# Patient Record
Sex: MALE | Race: WHITE | NOT HISPANIC OR LATINO | URBAN - METROPOLITAN AREA
[De-identification: names, ages, dates, MRNs, and addresses within clinical notes are randomized per-mention and may not be internally consistent; named-entity substitution may affect disease eponyms.]

---

## 2021-12-20 ENCOUNTER — APPOINTMENT (OUTPATIENT)
Age: 61
Setting detail: DERMATOLOGY
End: 2021-12-20

## 2021-12-20 VITALS — WEIGHT: 195 LBS | HEIGHT: 72 IN

## 2021-12-20 DIAGNOSIS — L82.1 OTHER SEBORRHEIC KERATOSIS: ICD-10-CM

## 2021-12-20 DIAGNOSIS — D22 MELANOCYTIC NEVI: ICD-10-CM

## 2021-12-20 PROBLEM — D48.5 NEOPLASM OF UNCERTAIN BEHAVIOR OF SKIN: Status: ACTIVE | Noted: 2021-12-20

## 2021-12-20 PROBLEM — D22.61 MELANOCYTIC NEVI OF RIGHT UPPER LIMB, INCLUDING SHOULDER: Status: ACTIVE | Noted: 2021-12-20

## 2021-12-20 PROCEDURE — OTHER BIOPSY BY SHAVE METHOD: OTHER

## 2021-12-20 PROCEDURE — OTHER REASSURANCE: OTHER

## 2021-12-20 PROCEDURE — 99203 OFFICE O/P NEW LOW 30 MIN: CPT | Mod: 25

## 2021-12-20 PROCEDURE — OTHER COUNSELING: OTHER

## 2021-12-20 PROCEDURE — 11102 TANGNTL BX SKIN SINGLE LES: CPT

## 2021-12-20 PROCEDURE — OTHER MIPS QUALITY: OTHER

## 2021-12-20 ASSESSMENT — LOCATION ZONE DERM
LOCATION ZONE: TRUNK
LOCATION ZONE: ARM
LOCATION ZONE: NECK
LOCATION ZONE: FACE

## 2021-12-20 ASSESSMENT — LOCATION DETAILED DESCRIPTION DERM
LOCATION DETAILED: RIGHT PROXIMAL LATERAL POSTERIOR UPPER ARM
LOCATION DETAILED: RIGHT MID-UPPER BACK
LOCATION DETAILED: RIGHT SUPERIOR LATERAL MIDBACK
LOCATION DETAILED: LEFT LATERAL FOREHEAD
LOCATION DETAILED: LEFT MID-UPPER BACK
LOCATION DETAILED: LEFT INFERIOR LATERAL MIDBACK
LOCATION DETAILED: LEFT CLAVICULAR NECK

## 2021-12-20 ASSESSMENT — LOCATION SIMPLE DESCRIPTION DERM
LOCATION SIMPLE: LEFT UPPER BACK
LOCATION SIMPLE: RIGHT UPPER ARM
LOCATION SIMPLE: LEFT ANTERIOR NECK
LOCATION SIMPLE: RIGHT UPPER BACK
LOCATION SIMPLE: RIGHT LOWER BACK
LOCATION SIMPLE: LEFT LOWER BACK
LOCATION SIMPLE: LEFT FOREHEAD

## 2021-12-20 NOTE — PROCEDURE: BIOPSY BY SHAVE METHOD
Wound Care: Petrolatum
Hide Second Anesthesia?: No
Was A Bandage Applied: Yes
Anesthesia Volume In Cc (Will Not Render If 0): 0.5
Cryotherapy Text: The wound bed was treated with cryotherapy after the biopsy was performed.
Hemostasis: Drysol
Electrodesiccation Text: The wound bed was treated with electrodesiccation after the biopsy was performed.
Detail Level: Detailed
Size Of Lesion In Cm: 0
Electrodesiccation And Curettage Text: The wound bed was treated with electrodesiccation and curettage after the biopsy was performed.
Anesthesia Type: 1% lidocaine with epinephrine
Information: Selecting Yes will display possible errors in your note based on the variables you have selected. This validation is only offered as a suggestion for you. PLEASE NOTE THAT THE VALIDATION TEXT WILL BE REMOVED WHEN YOU FINALIZE YOUR NOTE. IF YOU WANT TO FAX A PRELIMINARY NOTE YOU WILL NEED TO TOGGLE THIS TO 'NO' IF YOU DO NOT WANT IT IN YOUR FAXED NOTE.
Consent: Written consent was obtained and risks were reviewed including but not limited to scarring, infection, bleeding, scabbing, incomplete removal, nerve damage and allergy to anesthesia.
Biopsy Method: Dermablade
Post-Care Instructions: I reviewed with the patient in detail post-care instructions. Patient is to keep the biopsy site dry overnight, and then apply bacitracin twice daily until healed. Patient may apply hydrogen peroxide soaks to remove any crusting.
Curettage Text: The wound bed was treated with curettage after the biopsy was performed.
Dressing: bandage
Depth Of Biopsy: dermis
Billing Type: Third-Party Bill
Type Of Destruction Used: Curettage
Notification Instructions: Patient will be notified of biopsy results. However, patient instructed to call the office if not contacted within 2 weeks.
Silver Nitrate Text: The wound bed was treated with silver nitrate after the biopsy was performed.
Biopsy Type: H and E

## 2022-03-08 ENCOUNTER — APPOINTMENT (OUTPATIENT)
Age: 62
Setting detail: DERMATOLOGY
End: 2022-03-08

## 2022-03-08 DIAGNOSIS — D22 MELANOCYTIC NEVI: ICD-10-CM

## 2022-03-08 PROBLEM — D22.4 MELANOCYTIC NEVI OF SCALP AND NECK: Status: ACTIVE | Noted: 2022-03-08

## 2022-03-08 PROCEDURE — OTHER COUNSELING: OTHER

## 2022-03-08 PROCEDURE — OTHER OBSERVATION: OTHER

## 2022-03-08 PROCEDURE — 99212 OFFICE O/P EST SF 10 MIN: CPT

## 2022-03-08 ASSESSMENT — LOCATION SIMPLE DESCRIPTION DERM: LOCATION SIMPLE: LEFT ANTERIOR NECK

## 2022-03-08 ASSESSMENT — LOCATION DETAILED DESCRIPTION DERM: LOCATION DETAILED: LEFT CLAVICULAR NECK

## 2022-03-08 ASSESSMENT — LOCATION ZONE DERM: LOCATION ZONE: NECK

## 2022-09-08 ENCOUNTER — APPOINTMENT (OUTPATIENT)
Age: 62
Setting detail: DERMATOLOGY
End: 2022-09-08

## 2022-09-08 DIAGNOSIS — L57.0 ACTINIC KERATOSIS: ICD-10-CM

## 2022-09-08 DIAGNOSIS — D22 MELANOCYTIC NEVI: ICD-10-CM

## 2022-09-08 DIAGNOSIS — L82.1 OTHER SEBORRHEIC KERATOSIS: ICD-10-CM

## 2022-09-08 PROBLEM — D48.5 NEOPLASM OF UNCERTAIN BEHAVIOR OF SKIN: Status: ACTIVE | Noted: 2022-09-08

## 2022-09-08 PROBLEM — D22.4 MELANOCYTIC NEVI OF SCALP AND NECK: Status: ACTIVE | Noted: 2022-09-08

## 2022-09-08 PROCEDURE — 99213 OFFICE O/P EST LOW 20 MIN: CPT | Mod: 25

## 2022-09-08 PROCEDURE — 11103 TANGNTL BX SKIN EA SEP/ADDL: CPT

## 2022-09-08 PROCEDURE — 11102 TANGNTL BX SKIN SINGLE LES: CPT

## 2022-09-08 PROCEDURE — OTHER BIOPSY BY SHAVE METHOD: OTHER

## 2022-09-08 PROCEDURE — OTHER MIPS QUALITY: OTHER

## 2022-09-08 PROCEDURE — OTHER COUNSELING: OTHER

## 2022-09-08 ASSESSMENT — LOCATION ZONE DERM
LOCATION ZONE: TRUNK
LOCATION ZONE: ARM
LOCATION ZONE: SCALP
LOCATION ZONE: SCALP

## 2022-09-08 ASSESSMENT — LOCATION DETAILED DESCRIPTION DERM
LOCATION DETAILED: RIGHT SUPERIOR PARIETAL SCALP
LOCATION DETAILED: RIGHT ANTERIOR PROXIMAL UPPER ARM
LOCATION DETAILED: LEFT SUPERIOR UPPER BACK
LOCATION DETAILED: LEFT CENTRAL FRONTAL SCALP

## 2022-09-08 ASSESSMENT — LOCATION SIMPLE DESCRIPTION DERM
LOCATION SIMPLE: LEFT UPPER BACK
LOCATION SIMPLE: RIGHT UPPER ARM
LOCATION SIMPLE: LEFT SCALP
LOCATION SIMPLE: SCALP

## 2022-09-08 NOTE — PROCEDURE: BIOPSY BY SHAVE METHOD
Biopsy Method: Dermablade
Validate Lesion Size: No
Additional Anesthesia Volume In Cc (Will Not Render If 0): 0
Notification Instructions: Patient will be notified of biopsy results. However, patient instructed to call the office if not contacted within 2 weeks.
Anesthesia Volume In Cc (Will Not Render If 0): 0.5
Wound Care: Petrolatum
Anesthesia Type: 1% lidocaine with epinephrine
Electrodesiccation And Curettage Text: The wound bed was treated with electrodesiccation and curettage after the biopsy was performed.
Information: Selecting Yes will display possible errors in your note based on the variables you have selected. This validation is only offered as a suggestion for you. PLEASE NOTE THAT THE VALIDATION TEXT WILL BE REMOVED WHEN YOU FINALIZE YOUR NOTE. IF YOU WANT TO FAX A PRELIMINARY NOTE YOU WILL NEED TO TOGGLE THIS TO 'NO' IF YOU DO NOT WANT IT IN YOUR FAXED NOTE.
Billing Type: Third-Party Bill
Cryotherapy Text: The wound bed was treated with cryotherapy after the biopsy was performed.
Post-Care Instructions: I reviewed with the patient in detail post-care instructions. Patient is to keep the biopsy site dry overnight, and then apply bacitracin twice daily until healed. Patient may apply hydrogen peroxide soaks to remove any crusting.
Was A Bandage Applied: Yes
Detail Level: Detailed
Dressing: bandage
Hemostasis: Drysol
Biopsy Type: H and E
Silver Nitrate Text: The wound bed was treated with silver nitrate after the biopsy was performed.
Type Of Destruction Used: Curettage
Depth Of Biopsy: dermis
Electrodesiccation Text: The wound bed was treated with electrodesiccation after the biopsy was performed.
Consent: Written consent was obtained and risks were reviewed including but not limited to scarring, infection, bleeding, scabbing, incomplete removal, nerve damage and allergy to anesthesia.
Curettage Text: The wound bed was treated with curettage after the biopsy was performed.

## 2022-09-08 NOTE — PROCEDURE: MIPS QUALITY
Quality 111:Pneumonia Vaccination Status For Older Adults: Pneumococcal vaccine administered on or after patient’s 60th birthday and before the end of the measurement period
Detail Level: Detailed
Quality 431: Preventive Care And Screening: Unhealthy Alcohol Use - Screening: Patient not identified as an unhealthy alcohol user when screened for unhealthy alcohol use using a systematic screening method
Quality 226: Preventive Care And Screening: Tobacco Use: Screening And Cessation Intervention: Patient screened for tobacco use and is an ex/non-smoker
Quality 110: Preventive Care And Screening: Influenza Immunization: Influenza immunization was not ordered or administered, reason not given

## 2022-09-26 ENCOUNTER — APPOINTMENT (OUTPATIENT)
Age: 62
Setting detail: DERMATOLOGY
End: 2022-09-26

## 2022-09-26 DIAGNOSIS — L57.0 ACTINIC KERATOSIS: ICD-10-CM

## 2022-09-26 PROCEDURE — OTHER REASSURANCE: OTHER

## 2022-09-26 PROCEDURE — 17003 DESTRUCT PREMALG LES 2-14: CPT | Mod: 79

## 2022-09-26 PROCEDURE — OTHER LIQUID NITROGEN: OTHER

## 2022-09-26 PROCEDURE — OTHER PATHOLOGY DISCUSSION: OTHER

## 2022-09-26 PROCEDURE — OTHER COUNSELING: OTHER

## 2022-09-26 PROCEDURE — 99024 POSTOP FOLLOW-UP VISIT: CPT

## 2022-09-26 PROCEDURE — 17000 DESTRUCT PREMALG LESION: CPT | Mod: 79

## 2022-09-26 ASSESSMENT — LOCATION DETAILED DESCRIPTION DERM: LOCATION DETAILED: RIGHT SUPERIOR PARIETAL SCALP

## 2022-09-26 ASSESSMENT — LOCATION ZONE DERM: LOCATION ZONE: SCALP

## 2022-09-26 ASSESSMENT — LOCATION SIMPLE DESCRIPTION DERM: LOCATION SIMPLE: SCALP

## 2022-09-26 NOTE — PROCEDURE: LIQUID NITROGEN
Render Post-Care Instructions In Note?: no
Show Applicator Variable?: Yes
Number Of Freeze-Thaw Cycles: 1 freeze-thaw cycle
Consent: The patient's consent was obtained including but not limited to risks of crusting, scabbing, blistering, scarring, darker or lighter pigmentary change, recurrence, incomplete removal and infection.
Post-Care Instructions: I reviewed with the patient in detail post-care instructions. Patient is to wear sunprotection, and avoid picking at any of the treated lesions. Pt may apply Vaseline to crusted or scabbing areas.
Detail Level: Detailed
Duration Of Freeze Thaw-Cycle (Seconds): 0

## 2022-11-07 ENCOUNTER — APPOINTMENT (OUTPATIENT)
Age: 62
Setting detail: DERMATOLOGY
End: 2022-11-07

## 2022-11-07 DIAGNOSIS — L82.0 INFLAMED SEBORRHEIC KERATOSIS: ICD-10-CM

## 2022-11-07 DIAGNOSIS — D22 MELANOCYTIC NEVI: ICD-10-CM

## 2022-11-07 DIAGNOSIS — L82.1 OTHER SEBORRHEIC KERATOSIS: ICD-10-CM

## 2022-11-07 PROBLEM — D48.5 NEOPLASM OF UNCERTAIN BEHAVIOR OF SKIN: Status: ACTIVE | Noted: 2022-11-07

## 2022-11-07 PROBLEM — D22.62 MELANOCYTIC NEVI OF LEFT UPPER LIMB, INCLUDING SHOULDER: Status: ACTIVE | Noted: 2022-11-07

## 2022-11-07 PROCEDURE — 99213 OFFICE O/P EST LOW 20 MIN: CPT | Mod: 25

## 2022-11-07 PROCEDURE — OTHER COUNSELING: OTHER

## 2022-11-07 PROCEDURE — OTHER BIOPSY BY SHAVE METHOD: OTHER

## 2022-11-07 PROCEDURE — 11102 TANGNTL BX SKIN SINGLE LES: CPT

## 2022-11-07 ASSESSMENT — LOCATION SIMPLE DESCRIPTION DERM
LOCATION SIMPLE: LEFT UPPER BACK
LOCATION SIMPLE: LEFT FOREARM
LOCATION SIMPLE: ABDOMEN

## 2022-11-07 ASSESSMENT — LOCATION DETAILED DESCRIPTION DERM
LOCATION DETAILED: LEFT DISTAL DORSAL FOREARM
LOCATION DETAILED: LEFT SUPERIOR LATERAL UPPER BACK
LOCATION DETAILED: EPIGASTRIC SKIN

## 2022-11-07 ASSESSMENT — LOCATION ZONE DERM
LOCATION ZONE: TRUNK
LOCATION ZONE: TRUNK
LOCATION ZONE: ARM

## 2022-11-07 NOTE — PROCEDURE: BIOPSY BY SHAVE METHOD
Validate That Anesthesia Is Not 0: No
Cryotherapy Text: The wound bed was treated with cryotherapy after the biopsy was performed.
Notification Instructions: Patient will be notified of biopsy results. However, patient instructed to call the office if not contacted within 2 weeks.
Biopsy Method: Dermablade
Anesthesia Type: 1% lidocaine with epinephrine
Additional Anesthesia Volume In Cc (Will Not Render If 0): 0
Was A Bandage Applied: Yes
Type Of Destruction Used: Curettage
Billing Type: Third-Party Bill
Silver Nitrate Text: The wound bed was treated with silver nitrate after the biopsy was performed.
Wound Care: Petrolatum
Post-Care Instructions: I reviewed with the patient in detail post-care instructions. Patient is to keep the biopsy site dry overnight, and then apply bacitracin twice daily until healed. Patient may apply hydrogen peroxide soaks to remove any crusting.
Biopsy Type: H and E
Information: Selecting Yes will display possible errors in your note based on the variables you have selected. This validation is only offered as a suggestion for you. PLEASE NOTE THAT THE VALIDATION TEXT WILL BE REMOVED WHEN YOU FINALIZE YOUR NOTE. IF YOU WANT TO FAX A PRELIMINARY NOTE YOU WILL NEED TO TOGGLE THIS TO 'NO' IF YOU DO NOT WANT IT IN YOUR FAXED NOTE.
Electrodesiccation Text: The wound bed was treated with electrodesiccation after the biopsy was performed.
Curettage Text: The wound bed was treated with curettage after the biopsy was performed.
Depth Of Biopsy: dermis
Hemostasis: Drysol
Anesthesia Volume In Cc (Will Not Render If 0): 0.5
Electrodesiccation And Curettage Text: The wound bed was treated with electrodesiccation and curettage after the biopsy was performed.
Consent: Written consent was obtained and risks were reviewed including but not limited to scarring, infection, bleeding, scabbing, incomplete removal, nerve damage and allergy to anesthesia.
Detail Level: Detailed
Dressing: bandage

## 2022-11-28 ENCOUNTER — APPOINTMENT (OUTPATIENT)
Age: 62
Setting detail: DERMATOLOGY
End: 2022-11-28

## 2022-11-28 DIAGNOSIS — L82.0 INFLAMED SEBORRHEIC KERATOSIS: ICD-10-CM

## 2022-11-28 DIAGNOSIS — Z48.02 ENCOUNTER FOR REMOVAL OF SUTURES: ICD-10-CM

## 2022-11-28 PROCEDURE — OTHER PATHOLOGY DISCUSSION: OTHER

## 2022-11-28 PROCEDURE — OTHER COUNSELING: OTHER

## 2022-11-28 PROCEDURE — OTHER MIPS QUALITY: OTHER

## 2022-11-28 PROCEDURE — 99024 POSTOP FOLLOW-UP VISIT: CPT

## 2022-11-28 ASSESSMENT — LOCATION DETAILED DESCRIPTION DERM: LOCATION DETAILED: LEFT LATERAL ABDOMEN

## 2022-11-28 ASSESSMENT — LOCATION ZONE DERM: LOCATION ZONE: TRUNK

## 2022-11-28 ASSESSMENT — LOCATION SIMPLE DESCRIPTION DERM: LOCATION SIMPLE: ABDOMEN

## 2022-11-28 NOTE — PROCEDURE: PATHOLOGY DISCUSSION
Detail Level: Zone
Add 77376 Cpt? (Important Note: In 2017 The Use Of 77953 Is Being Tracked By Cms To Determine Future Global Period Reimbursement For Global Periods): yes

## 2022-11-28 NOTE — PROCEDURE: MIPS QUALITY
Detail Level: Detailed
Quality 226: Preventive Care And Screening: Tobacco Use: Screening And Cessation Intervention: Patient screened for tobacco use and is an ex/non-smoker
Quality 111:Pneumonia Vaccination Status For Older Adults: Pneumococcal vaccine (PPSV23) administered on or after patient’s 60th birthday and before the end of the measurement period
Quality 110: Preventive Care And Screening: Influenza Immunization: Influenza Immunization previously received during influenza season
Quality 431: Preventive Care And Screening: Unhealthy Alcohol Use - Screening: Patient not identified as an unhealthy alcohol user when screened for unhealthy alcohol use using a systematic screening method

## 2023-05-18 ENCOUNTER — APPOINTMENT (OUTPATIENT)
Dept: URBAN - METROPOLITAN AREA SURGERY 21 | Age: 63
Setting detail: DERMATOLOGY
End: 2023-05-18

## 2023-05-18 DIAGNOSIS — D49.2 NEOPLASM OF UNSPECIFIED BEHAVIOR OF BONE, SOFT TISSUE, AND SKIN: ICD-10-CM

## 2023-05-18 DIAGNOSIS — L94.2 CALCINOSIS CUTIS: ICD-10-CM

## 2023-05-18 DIAGNOSIS — L81.4 OTHER MELANIN HYPERPIGMENTATION: ICD-10-CM

## 2023-05-18 DIAGNOSIS — L57.0 ACTINIC KERATOSIS: ICD-10-CM

## 2023-05-18 PROCEDURE — 11102 TANGNTL BX SKIN SINGLE LES: CPT

## 2023-05-18 PROCEDURE — OTHER BIOPSY BY SHAVE METHOD: OTHER

## 2023-05-18 PROCEDURE — 99212 OFFICE O/P EST SF 10 MIN: CPT | Mod: 25

## 2023-05-18 PROCEDURE — 17000 DESTRUCT PREMALG LESION: CPT | Mod: 59

## 2023-05-18 PROCEDURE — 11103 TANGNTL BX SKIN EA SEP/ADDL: CPT

## 2023-05-18 PROCEDURE — OTHER COUNSELING: OTHER

## 2023-05-18 PROCEDURE — OTHER LIQUID NITROGEN: OTHER

## 2023-05-18 PROCEDURE — 17003 DESTRUCT PREMALG LES 2-14: CPT

## 2023-05-18 ASSESSMENT — LOCATION SIMPLE DESCRIPTION DERM
LOCATION SIMPLE: LEFT UPPER BACK
LOCATION SIMPLE: TRAPEZIAL NECK
LOCATION SIMPLE: POSTERIOR SCALP
LOCATION SIMPLE: RIGHT SHOULDER
LOCATION SIMPLE: RIGHT UPPER BACK
LOCATION SIMPLE: LEFT SCALP
LOCATION SIMPLE: ANTERIOR SCALP
LOCATION SIMPLE: SCALP
LOCATION SIMPLE: RIGHT TEMPLE
LOCATION SIMPLE: LEFT HAND
LOCATION SIMPLE: LEFT SHOULDER
LOCATION SIMPLE: LEFT TEMPLE
LOCATION SIMPLE: LEFT LOWER BACK
LOCATION SIMPLE: POSTERIOR NECK
LOCATION SIMPLE: RIGHT PRETIBIAL REGION
LOCATION SIMPLE: UPPER BACK

## 2023-05-18 ASSESSMENT — LOCATION ZONE DERM
LOCATION ZONE: SCALP
LOCATION ZONE: TRUNK
LOCATION ZONE: ARM
LOCATION ZONE: LEG
LOCATION ZONE: FACE
LOCATION ZONE: NECK
LOCATION ZONE: HAND

## 2023-05-18 ASSESSMENT — LOCATION DETAILED DESCRIPTION DERM
LOCATION DETAILED: LEFT SUPERIOR LATERAL UPPER BACK
LOCATION DETAILED: RIGHT POSTERIOR SHOULDER
LOCATION DETAILED: LEFT POSTERIOR SHOULDER
LOCATION DETAILED: LEFT SUPERIOR UPPER BACK
LOCATION DETAILED: LEFT RADIAL DORSAL HAND
LOCATION DETAILED: MID-FRONTAL SCALP
LOCATION DETAILED: LEFT LATERAL TRAPEZIAL NECK
LOCATION DETAILED: RIGHT SUPERIOR LATERAL UPPER BACK
LOCATION DETAILED: LEFT SUPERIOR MEDIAL UPPER BACK
LOCATION DETAILED: LEFT INFERIOR TEMPLE
LOCATION DETAILED: LEFT MEDIAL FRONTAL SCALP
LOCATION DETAILED: RIGHT DISTAL PRETIBIAL REGION
LOCATION DETAILED: LEFT INFERIOR LATERAL MIDBACK
LOCATION DETAILED: RIGHT SUPERIOR UPPER BACK
LOCATION DETAILED: RIGHT SUPERIOR PARIETAL SCALP
LOCATION DETAILED: SUPERIOR THORACIC SPINE
LOCATION DETAILED: RIGHT MID TEMPLE
LOCATION DETAILED: MID TRAPEZIAL NECK
LOCATION DETAILED: POSTERIOR MID-PARIETAL SCALP

## 2023-05-18 NOTE — PROCEDURE: BIOPSY BY SHAVE METHOD

## 2023-06-09 ENCOUNTER — APPOINTMENT (OUTPATIENT)
Dept: URBAN - METROPOLITAN AREA SURGERY 21 | Age: 63
Setting detail: DERMATOLOGY
End: 2023-06-09

## 2023-06-09 DIAGNOSIS — L82.1 OTHER SEBORRHEIC KERATOSIS: ICD-10-CM

## 2023-06-09 PROCEDURE — OTHER COUNSELING: OTHER

## 2023-06-09 PROCEDURE — 99212 OFFICE O/P EST SF 10 MIN: CPT

## 2023-06-09 ASSESSMENT — LOCATION ZONE DERM
LOCATION ZONE: FACE
LOCATION ZONE: TRUNK

## 2023-06-09 ASSESSMENT — LOCATION SIMPLE DESCRIPTION DERM
LOCATION SIMPLE: RIGHT TEMPLE
LOCATION SIMPLE: LEFT UPPER BACK

## 2023-06-09 ASSESSMENT — LOCATION DETAILED DESCRIPTION DERM
LOCATION DETAILED: LEFT INFERIOR UPPER BACK
LOCATION DETAILED: RIGHT SUPERIOR TEMPLE

## 2023-12-15 ENCOUNTER — APPOINTMENT (OUTPATIENT)
Dept: URBAN - METROPOLITAN AREA SURGERY 21 | Age: 63
Setting detail: DERMATOLOGY
End: 2023-12-15

## 2023-12-15 DIAGNOSIS — D49.2 NEOPLASM OF UNSPECIFIED BEHAVIOR OF BONE, SOFT TISSUE, AND SKIN: ICD-10-CM

## 2023-12-15 DIAGNOSIS — L81.4 OTHER MELANIN HYPERPIGMENTATION: ICD-10-CM

## 2023-12-15 DIAGNOSIS — L94.2 CALCINOSIS CUTIS: ICD-10-CM

## 2023-12-15 DIAGNOSIS — L72.8 OTHER FOLLICULAR CYSTS OF THE SKIN AND SUBCUTANEOUS TISSUE: ICD-10-CM

## 2023-12-15 PROCEDURE — 11102 TANGNTL BX SKIN SINGLE LES: CPT

## 2023-12-15 PROCEDURE — OTHER COUNSELING: OTHER

## 2023-12-15 PROCEDURE — OTHER BIOPSY BY SHAVE METHOD: OTHER

## 2023-12-15 PROCEDURE — 99213 OFFICE O/P EST LOW 20 MIN: CPT | Mod: 25

## 2023-12-15 PROCEDURE — OTHER CONSULTATION EXCISION: OTHER

## 2023-12-15 ASSESSMENT — LOCATION DETAILED DESCRIPTION DERM
LOCATION DETAILED: RIGHT LATERAL SUPERIOR CHEST
LOCATION DETAILED: STERNUM
LOCATION DETAILED: LEFT MID-UPPER BACK
LOCATION DETAILED: RIGHT SUPERIOR MEDIAL UPPER BACK
LOCATION DETAILED: RIGHT POSTERIOR SHOULDER
LOCATION DETAILED: RIGHT CENTRAL ZYGOMA
LOCATION DETAILED: RIGHT MEDIAL INFERIOR CHEST
LOCATION DETAILED: RIGHT DISTAL PRETIBIAL REGION
LOCATION DETAILED: RIGHT MID-UPPER BACK
LOCATION DETAILED: LEFT POSTERIOR SHOULDER
LOCATION DETAILED: LEFT SUPERIOR UPPER BACK
LOCATION DETAILED: LEFT SUPERIOR MEDIAL MIDBACK
LOCATION DETAILED: LEFT LATERAL SUPERIOR CHEST
LOCATION DETAILED: RIGHT LATERAL UPPER BACK
LOCATION DETAILED: MID TRAPEZIAL NECK

## 2023-12-15 ASSESSMENT — LOCATION ZONE DERM
LOCATION ZONE: ARM
LOCATION ZONE: FACE
LOCATION ZONE: LEG
LOCATION ZONE: TRUNK
LOCATION ZONE: NECK

## 2023-12-15 ASSESSMENT — LOCATION SIMPLE DESCRIPTION DERM
LOCATION SIMPLE: LEFT LOWER BACK
LOCATION SIMPLE: CHEST
LOCATION SIMPLE: TRAPEZIAL NECK
LOCATION SIMPLE: LEFT SHOULDER
LOCATION SIMPLE: LEFT UPPER BACK
LOCATION SIMPLE: RIGHT SHOULDER
LOCATION SIMPLE: RIGHT UPPER BACK
LOCATION SIMPLE: RIGHT ZYGOMA
LOCATION SIMPLE: RIGHT PRETIBIAL REGION

## 2023-12-15 NOTE — PROCEDURE: BIOPSY BY SHAVE METHOD
Detail Level: Detailed
Depth Of Biopsy: dermis
Was A Bandage Applied: Yes
Size Of Lesion In Cm: 0
Biopsy Type: H and E
Biopsy Method: Dermablade
Anesthesia Type: 1% lidocaine with epinephrine
Anesthesia Volume In Cc: 0.5
Hemostasis: Drysol
Wound Care: Petrolatum
Dressing: bandage
Destruction After The Procedure: No
Type Of Destruction Used: Curettage
Curettage Text: The wound bed was treated with curettage after the biopsy was performed.
Cryotherapy Text: The wound bed was treated with cryotherapy after the biopsy was performed.
Electrodesiccation Text: The wound bed was treated with electrodesiccation after the biopsy was performed.
Electrodesiccation And Curettage Text: The wound bed was treated with electrodesiccation and curettage after the biopsy was performed.
Silver Nitrate Text: The wound bed was treated with silver nitrate after the biopsy was performed.
Lab: -[id number]
Consent: Written consent was obtained and risks were reviewed including but not limited to scarring, infection, bleeding, scabbing, incomplete removal, nerve damage and allergy to anesthesia.
Post-Care Instructions: I reviewed with the patient in detail post-care instructions. Patient is to keep the biopsy site dry overnight, and then apply bacitracin twice daily until healed. Patient may apply hydrogen peroxide soaks to remove any crusting.
Notification Instructions: Patient will be notified of biopsy results. However, patient instructed to call the office if not contacted within 2 weeks.
Billing Type: Third-Party Bill
Information: Selecting Yes will display possible errors in your note based on the variables you have selected. This validation is only offered as a suggestion for you. PLEASE NOTE THAT THE VALIDATION TEXT WILL BE REMOVED WHEN YOU FINALIZE YOUR NOTE. IF YOU WANT TO FAX A PRELIMINARY NOTE YOU WILL NEED TO TOGGLE THIS TO 'NO' IF YOU DO NOT WANT IT IN YOUR FAXED NOTE.

## 2024-01-25 ENCOUNTER — APPOINTMENT (OUTPATIENT)
Dept: URBAN - METROPOLITAN AREA SURGERY 21 | Age: 64
Setting detail: DERMATOLOGY
End: 2024-01-25

## 2024-01-25 DIAGNOSIS — B07.8 OTHER VIRAL WARTS: ICD-10-CM

## 2024-01-25 DIAGNOSIS — L72.8 OTHER FOLLICULAR CYSTS OF THE SKIN AND SUBCUTANEOUS TISSUE: ICD-10-CM

## 2024-01-25 PROCEDURE — 99212 OFFICE O/P EST SF 10 MIN: CPT | Mod: 25

## 2024-01-25 PROCEDURE — OTHER REASSURANCE: OTHER

## 2024-01-25 PROCEDURE — OTHER EXCISION: OTHER

## 2024-01-25 PROCEDURE — 11402 EXC TR-EXT B9+MARG 1.1-2 CM: CPT | Mod: 76

## 2024-01-25 PROCEDURE — OTHER COUNSELING: OTHER

## 2024-01-25 PROCEDURE — 11402 EXC TR-EXT B9+MARG 1.1-2 CM: CPT

## 2024-01-25 PROCEDURE — 12032 INTMD RPR S/A/T/EXT 2.6-7.5: CPT

## 2024-01-25 PROCEDURE — OTHER MIPS QUALITY: OTHER

## 2024-01-25 ASSESSMENT — LOCATION SIMPLE DESCRIPTION DERM
LOCATION SIMPLE: RIGHT ZYGOMA
LOCATION SIMPLE: RIGHT UPPER BACK
LOCATION SIMPLE: LEFT LOWER BACK
LOCATION SIMPLE: RIGHT TEMPLE

## 2024-01-25 ASSESSMENT — LOCATION DETAILED DESCRIPTION DERM
LOCATION DETAILED: RIGHT LATERAL ZYGOMA
LOCATION DETAILED: RIGHT LATERAL UPPER BACK
LOCATION DETAILED: RIGHT CENTRAL TEMPLE
LOCATION DETAILED: LEFT SUPERIOR MEDIAL MIDBACK

## 2024-01-25 ASSESSMENT — LOCATION ZONE DERM
LOCATION ZONE: FACE
LOCATION ZONE: TRUNK

## 2024-01-25 NOTE — PROCEDURE: COUNSELING
November 1, 2017       Damon Lee   [address]       To Whom It May Concern:    Damon Jesus has been under our care regarding ongoing medical issues since 10/29/1.  Because of this, she has been required to be off Detail Level: Detailed

## 2024-01-25 NOTE — PROCEDURE: EXCISION

## 2024-02-08 ENCOUNTER — APPOINTMENT (OUTPATIENT)
Dept: URBAN - METROPOLITAN AREA SURGERY 21 | Age: 64
Setting detail: DERMATOLOGY
End: 2024-02-08

## 2024-02-08 DIAGNOSIS — Z48.02 ENCOUNTER FOR REMOVAL OF SUTURES: ICD-10-CM

## 2024-02-08 PROCEDURE — OTHER PATHOLOGY DISCUSSION: OTHER

## 2024-02-08 PROCEDURE — OTHER SUTURE REMOVAL (GLOBAL PERIOD): OTHER

## 2024-02-08 PROCEDURE — 99024 POSTOP FOLLOW-UP VISIT: CPT

## 2024-02-08 ASSESSMENT — LOCATION ZONE DERM: LOCATION ZONE: TRUNK

## 2024-02-08 ASSESSMENT — LOCATION SIMPLE DESCRIPTION DERM
LOCATION SIMPLE: LEFT LOWER BACK
LOCATION SIMPLE: RIGHT UPPER BACK

## 2024-02-08 ASSESSMENT — LOCATION DETAILED DESCRIPTION DERM
LOCATION DETAILED: LEFT SUPERIOR MEDIAL MIDBACK
LOCATION DETAILED: RIGHT LATERAL UPPER BACK

## 2024-02-08 NOTE — PROCEDURE: SUTURE REMOVAL (GLOBAL PERIOD)
Detail Level: Detailed
Add 50675 Cpt? (Important Note: In 2017 The Use Of 76967 Is Being Tracked By Cms To Determine Future Global Period Reimbursement For Global Periods): no

## 2024-02-08 NOTE — PROCEDURE: PATHOLOGY DISCUSSION
Detail Level: Zone
Add 84136 Cpt? (Important Note: In 2017 The Use Of 62590 Is Being Tracked By Cms To Determine Future Global Period Reimbursement For Global Periods): yes

## 2024-06-17 ENCOUNTER — APPOINTMENT (OUTPATIENT)
Dept: URBAN - METROPOLITAN AREA SURGERY 21 | Age: 64
Setting detail: DERMATOLOGY
End: 2024-06-18

## 2024-06-17 DIAGNOSIS — L81.4 OTHER MELANIN HYPERPIGMENTATION: ICD-10-CM

## 2024-06-17 DIAGNOSIS — L82.1 OTHER SEBORRHEIC KERATOSIS: ICD-10-CM

## 2024-06-17 DIAGNOSIS — L23.9 ALLERGIC CONTACT DERMATITIS, UNSPECIFIED CAUSE: ICD-10-CM

## 2024-06-17 PROCEDURE — OTHER PRESCRIPTION MEDICATION MANAGEMENT: OTHER

## 2024-06-17 PROCEDURE — OTHER COUNSELING: OTHER

## 2024-06-17 PROCEDURE — 99213 OFFICE O/P EST LOW 20 MIN: CPT

## 2024-06-17 PROCEDURE — OTHER MIPS QUALITY: OTHER

## 2024-06-17 ASSESSMENT — LOCATION SIMPLE DESCRIPTION DERM
LOCATION SIMPLE: LEFT SHOULDER
LOCATION SIMPLE: LEFT UPPER BACK
LOCATION SIMPLE: ABDOMEN
LOCATION SIMPLE: TRAPEZIAL NECK
LOCATION SIMPLE: RIGHT SHOULDER
LOCATION SIMPLE: RIGHT UPPER BACK
LOCATION SIMPLE: CHEST

## 2024-06-17 ASSESSMENT — LOCATION ZONE DERM
LOCATION ZONE: TRUNK
LOCATION ZONE: NECK
LOCATION ZONE: ARM

## 2024-06-17 ASSESSMENT — LOCATION DETAILED DESCRIPTION DERM
LOCATION DETAILED: LEFT POSTERIOR SHOULDER
LOCATION DETAILED: RIGHT POSTERIOR SHOULDER
LOCATION DETAILED: RIGHT MEDIAL INFERIOR CHEST
LOCATION DETAILED: RIGHT LATERAL SUPERIOR CHEST
LOCATION DETAILED: LEFT INFERIOR UPPER BACK
LOCATION DETAILED: LEFT LATERAL SUPERIOR CHEST
LOCATION DETAILED: RIGHT SUPERIOR MEDIAL UPPER BACK
LOCATION DETAILED: LEFT SUPERIOR UPPER BACK
LOCATION DETAILED: LEFT MID-UPPER BACK
LOCATION DETAILED: STERNUM
LOCATION DETAILED: XIPHOID
LOCATION DETAILED: LEFT LATERAL UPPER BACK
LOCATION DETAILED: MID TRAPEZIAL NECK

## 2025-06-06 ENCOUNTER — APPOINTMENT (OUTPATIENT)
Dept: URBAN - METROPOLITAN AREA SURGERY 21 | Age: 65
Setting detail: DERMATOLOGY
End: 2025-06-09

## 2025-06-06 DIAGNOSIS — L57.0 ACTINIC KERATOSIS: ICD-10-CM

## 2025-06-06 DIAGNOSIS — L81.4 OTHER MELANIN HYPERPIGMENTATION: ICD-10-CM

## 2025-06-06 DIAGNOSIS — L23.9 ALLERGIC CONTACT DERMATITIS, UNSPECIFIED CAUSE: ICD-10-CM

## 2025-06-06 DIAGNOSIS — D22 MELANOCYTIC NEVI: ICD-10-CM

## 2025-06-06 PROBLEM — D22.5 MELANOCYTIC NEVI OF TRUNK: Status: ACTIVE | Noted: 2025-06-06

## 2025-06-06 PROCEDURE — 99213 OFFICE O/P EST LOW 20 MIN: CPT | Mod: 25

## 2025-06-06 PROCEDURE — OTHER LIQUID NITROGEN: OTHER

## 2025-06-06 PROCEDURE — OTHER PRESCRIPTION MEDICATION MANAGEMENT: OTHER

## 2025-06-06 PROCEDURE — OTHER COUNSELING: OTHER

## 2025-06-06 PROCEDURE — OTHER MIPS QUALITY: OTHER

## 2025-06-06 PROCEDURE — 17000 DESTRUCT PREMALG LESION: CPT

## 2025-06-06 ASSESSMENT — LOCATION SIMPLE DESCRIPTION DERM
LOCATION SIMPLE: LEFT ANTERIOR NECK
LOCATION SIMPLE: RIGHT CALF
LOCATION SIMPLE: RIGHT KNEE
LOCATION SIMPLE: CHEST
LOCATION SIMPLE: RIGHT LOWER BACK
LOCATION SIMPLE: LEFT FOREARM
LOCATION SIMPLE: RIGHT SHOULDER
LOCATION SIMPLE: ABDOMEN
LOCATION SIMPLE: RIGHT UPPER ARM
LOCATION SIMPLE: LEFT EAR
LOCATION SIMPLE: RIGHT PRETIBIAL REGION
LOCATION SIMPLE: LEFT CALF
LOCATION SIMPLE: RIGHT UPPER BACK
LOCATION SIMPLE: LEFT UPPER BACK

## 2025-06-06 ASSESSMENT — LOCATION DETAILED DESCRIPTION DERM
LOCATION DETAILED: RIGHT MID-UPPER BACK
LOCATION DETAILED: RIGHT SUPERIOR LATERAL LOWER BACK
LOCATION DETAILED: LEFT MEDIAL SUPERIOR CHEST
LOCATION DETAILED: RIGHT ANTERIOR PROXIMAL UPPER ARM
LOCATION DETAILED: RIGHT POSTERIOR SHOULDER
LOCATION DETAILED: LEFT CLAVICULAR NECK
LOCATION DETAILED: LEFT VENTRAL PROXIMAL FOREARM
LOCATION DETAILED: LEFT SUPERIOR CRUS OF ANTIHELIX
LOCATION DETAILED: LEFT INFERIOR UPPER BACK
LOCATION DETAILED: RIGHT MEDIAL KNEE
LOCATION DETAILED: RIGHT ANTERIOR DISTAL UPPER ARM
LOCATION DETAILED: RIGHT DISTAL PRETIBIAL REGION
LOCATION DETAILED: RIGHT MEDIAL SUPERIOR CHEST
LOCATION DETAILED: PERIUMBILICAL SKIN
LOCATION DETAILED: LEFT DISTAL CALF
LOCATION DETAILED: LEFT SUPERIOR LATERAL UPPER BACK
LOCATION DETAILED: XIPHOID
LOCATION DETAILED: LEFT DISTAL DORSAL FOREARM
LOCATION DETAILED: RIGHT PROXIMAL CALF

## 2025-06-06 ASSESSMENT — LOCATION ZONE DERM
LOCATION ZONE: ARM
LOCATION ZONE: EAR
LOCATION ZONE: LEG
LOCATION ZONE: NECK
LOCATION ZONE: TRUNK